# Patient Record
(demographics unavailable — no encounter records)

---
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
## 2025-06-13 NOTE — ASSESSMENT
[FreeTextEntry1] : EXAM Right hand with dorsal swelling. Skin intact. +ttp at 5th metacarpal, -ecchymosis. Able to make gentle fist with no rotational deformity. Sensation intact throughout. <2sec cap refill.  Right hand radiographs demonstrate 5th metacarpal fracture, nondisplaced.  (3-view as viewed by me from outside facility)  ASSESSMENT/PLAN Right 5th metacarpal fracture, non displaced - will manage with closed management [CPT 67647]  Reviewed radiographs with patient. Discussed radiograph alignment is within acceptable parameters for closed management. NWB, Elevate. Risk of pain, stiffness, malunion, nonunion, rotational malalignment, post-traumatic arthrosis, and displacement requiring further intervention.  Acute complicated injury - risk of nonunion, malunion, loss of contours of metacarpal head  F/u 3weeks; repeat films

## 2025-06-13 NOTE — ASSESSMENT
[FreeTextEntry1] : EXAM Right hand with dorsal swelling. Skin intact. +ttp at 5th metacarpal, -ecchymosis. Able to make gentle fist with no rotational deformity. Sensation intact throughout. <2sec cap refill.  Right hand radiographs demonstrate 5th metacarpal fracture, nondisplaced.  (3-view as viewed by me from outside facility)  ASSESSMENT/PLAN Right 5th metacarpal fracture, non displaced - will manage with closed management [CPT 90519]  Reviewed radiographs with patient. Discussed radiograph alignment is within acceptable parameters for closed management. NWB, Elevate. Risk of pain, stiffness, malunion, nonunion, rotational malalignment, post-traumatic arthrosis, and displacement requiring further intervention.  Acute complicated injury - risk of nonunion, malunion, loss of contours of metacarpal head  F/u 3weeks; repeat films